# Patient Record
Sex: FEMALE | Race: BLACK OR AFRICAN AMERICAN | NOT HISPANIC OR LATINO | Employment: FULL TIME | URBAN - METROPOLITAN AREA
[De-identification: names, ages, dates, MRNs, and addresses within clinical notes are randomized per-mention and may not be internally consistent; named-entity substitution may affect disease eponyms.]

---

## 2018-02-10 LAB — DEPRECATED S PYO AG THROAT QL EIA: NEGATIVE

## 2018-02-10 PROCEDURE — 99283 EMERGENCY DEPT VISIT LOW MDM: CPT | Mod: 25

## 2018-02-10 PROCEDURE — 81025 URINE PREGNANCY TEST: CPT | Performed by: EMERGENCY MEDICINE

## 2018-02-10 PROCEDURE — 87880 STREP A ASSAY W/OPTIC: CPT

## 2018-02-10 PROCEDURE — 87081 CULTURE SCREEN ONLY: CPT

## 2018-02-11 ENCOUNTER — HOSPITAL ENCOUNTER (EMERGENCY)
Facility: OTHER | Age: 23
Discharge: HOME | End: 2018-02-11
Attending: EMERGENCY MEDICINE
Payer: MEDICAID

## 2018-02-11 VITALS
DIASTOLIC BLOOD PRESSURE: 60 MMHG | SYSTOLIC BLOOD PRESSURE: 120 MMHG | BODY MASS INDEX: 36.65 KG/M2 | OXYGEN SATURATION: 99 % | HEIGHT: 65 IN | WEIGHT: 220 LBS | HEART RATE: 82 BPM | TEMPERATURE: 99 F | RESPIRATION RATE: 16 BRPM

## 2018-02-11 DIAGNOSIS — J02.9 VIRAL PHARYNGITIS: Primary | ICD-10-CM

## 2018-02-11 LAB
B-HCG UR QL: NEGATIVE
CTP QC/QA: YES

## 2018-02-11 PROCEDURE — 81025 URINE PREGNANCY TEST: CPT | Performed by: EMERGENCY MEDICINE

## 2018-02-11 NOTE — ED PROVIDER NOTES
Encounter Date: 2/10/2018    SCRIBE #1 NOTE: I, Lucia Cole, am scribing for, and in the presence of, Dr. Walsh.       History     Chief Complaint   Patient presents with    Sore Throat     Pt painfull swollowing, and voice change since last night     Time seen by provider: 12:24 AM    This is a 22 y.o. female who presents with complaint of sore throat that began yesterday. Patient reports associated voice change. She reports no identifying, exacerbating, or alleviating factors for symptoms. She denies any fever, chills, congestion, cough, shortness of breath, nausea, or vomiting. Patient reports having sick contacts. Patient admits to occasional alcohol use, but denies tobacco or alcohol use.       The history is provided by the patient.     Review of patient's allergies indicates:  No Known Allergies  History reviewed. No pertinent past medical history.  History reviewed. No pertinent surgical history.  Family History   Problem Relation Age of Onset    Breast cancer Neg Hx     Colon cancer Neg Hx     Ovarian cancer Neg Hx      Social History   Substance Use Topics    Smoking status: Former Smoker    Smokeless tobacco: Never Used    Alcohol use Yes      Comment: occasional      Review of Systems   Constitutional: Negative for chills and fever.   HENT: Positive for sore throat and voice change. Negative for congestion.    Eyes: Negative for photophobia and redness.   Respiratory: Negative for cough and shortness of breath.    Cardiovascular: Negative for chest pain.   Gastrointestinal: Negative for abdominal pain, nausea and vomiting.   Genitourinary: Negative for dysuria.   Musculoskeletal: Negative for back pain.   Skin: Negative for rash.   Neurological: Negative for weakness, light-headedness and headaches.   Psychiatric/Behavioral: Negative for confusion.       Physical Exam     Initial Vitals [02/10/18 2336]   BP Pulse Resp Temp SpO2   124/60 86 16 98.6 °F (37 °C) 99 %      MAP       81.33          Physical Exam    Nursing note and vitals reviewed.  Constitutional: She appears well-developed and well-nourished. She is not diaphoretic. No distress.   HENT:   Head: Normocephalic and atraumatic.   Mild tonsillar erythema with no exudates. No peritonsillar or tonsillar abscess formation.     Eyes: Conjunctivae and EOM are normal. Pupils are equal, round, and reactive to light. No scleral icterus.   Neck: Normal range of motion. Neck supple.   Cardiovascular: Normal rate, regular rhythm, S1 normal, S2 normal and normal heart sounds. Exam reveals no gallop and no friction rub.    No murmur heard.  Pulmonary/Chest: Breath sounds normal. No respiratory distress. She has no wheezes. She has no rhonchi. She has no rales.   Abdominal: Soft. Bowel sounds are normal. There is no tenderness. There is no rebound and no guarding.   Musculoskeletal: Normal range of motion. She exhibits no edema or tenderness.   No lower extremity edema.    Lymphadenopathy:     She has no cervical adenopathy.   Neurological: She is alert and oriented to person, place, and time.   Skin: Skin is warm and dry. Capillary refill takes less than 2 seconds. No rash noted. No pallor.   No skin tenting.    Psychiatric: She has a normal mood and affect. Her behavior is normal. Judgment and thought content normal.         ED Course   Procedures  Labs Reviewed   THROAT SCREEN, RAPID   CULTURE, STREP A,  THROAT   POCT URINE PREGNANCY             Medical Decision Making:   Clinical Tests:   Lab Tests: Ordered and Reviewed              Attending Attestation:           Physician Attestation for Scribe:  Physician Attestation Statement for Scribe #1: I, Dr. Walsh, reviewed documentation, as scribed by Lucia Cole in my presence, and it is both accurate and complete.         Attending ED Notes:   Urgent evaluation a 22-year-old female with sore throat.  Patient is afebrile, nontoxic-appearing with stable vital signs.  No trismus, drooling or stridor.   Patient tolerating by mouth intake without complication.  Strep screen is negative.  No peritonsillar tonsillar abscess formation.  The patient is extensively counseled on her diagnosis and treatment, discharged in good condition and directed follow-up with her PCP in the next 24-48 hours.          ED Course      Clinical Impression:     1. Viral pharyngitis                               Noam Manley MD  02/20/18 0745

## 2018-02-11 NOTE — ED NOTES
HEENT: + sore throat, + runny nose, + sneezing.   LOC: The patient is awake, alert and aware of environment with an appropriate affect, the patient is oriented x 4 with appropriate speech.  APPEARANCE: Patient resting comfortably and in no acute distress, clean and well groomed.  SKIN: The skin is warm and dry, patient has normal skin turgor, skin is intact, no breakdown or brusing noted.  MUSKULOSKELETAL: Patient observed ambulating with a steady gait. Patient moving all extremities well, no obvious deformities noted.  RESPIRATORY: Airway is open and patent, respirations are spontaneous, patient has a normal effort and rate. Breath sounds are clear and equal bilaterally. Denies cough. + chest congestion.   CARDIAC: Heart rate and rhythm normal, capillary refill < 3 seconds, no edema noted.

## 2018-02-11 NOTE — ED TRIAGE NOTES
Pt presents to the ED with c/o sore throat since yesterday. + runny nose, + sneezing, + chest congestion, -fever. Pt reports taking theraflu OTC and claritin with no relief. Pt reports similar symptoms last year when she had a cold.

## 2018-02-13 LAB — BACTERIA THROAT CULT: NORMAL

## 2018-02-18 ENCOUNTER — HOSPITAL ENCOUNTER (EMERGENCY)
Facility: OTHER | Age: 23
Discharge: HOME | End: 2018-02-18
Attending: EMERGENCY MEDICINE
Payer: MEDICAID

## 2018-02-18 VITALS
RESPIRATION RATE: 18 BRPM | WEIGHT: 220 LBS | SYSTOLIC BLOOD PRESSURE: 135 MMHG | TEMPERATURE: 99 F | BODY MASS INDEX: 36.65 KG/M2 | DIASTOLIC BLOOD PRESSURE: 86 MMHG | OXYGEN SATURATION: 99 % | HEART RATE: 79 BPM | HEIGHT: 65 IN

## 2018-02-18 DIAGNOSIS — J06.9 VIRAL URI: Primary | ICD-10-CM

## 2018-02-18 LAB
B-HCG UR QL: NEGATIVE
CTP QC/QA: YES

## 2018-02-18 PROCEDURE — 63600175 PHARM REV CODE 636 W HCPCS: Performed by: PHYSICIAN ASSISTANT

## 2018-02-18 PROCEDURE — 96372 THER/PROPH/DIAG INJ SC/IM: CPT

## 2018-02-18 PROCEDURE — 99283 EMERGENCY DEPT VISIT LOW MDM: CPT | Mod: 25

## 2018-02-18 PROCEDURE — 81025 URINE PREGNANCY TEST: CPT | Performed by: EMERGENCY MEDICINE

## 2018-02-18 RX ORDER — KETOROLAC TROMETHAMINE 30 MG/ML
30 INJECTION, SOLUTION INTRAMUSCULAR; INTRAVENOUS
Status: COMPLETED | OUTPATIENT
Start: 2018-02-18 | End: 2018-02-18

## 2018-02-18 RX ORDER — IBUPROFEN 800 MG/1
800 TABLET ORAL EVERY 6 HOURS PRN
Qty: 20 TABLET | Refills: 0 | Status: SHIPPED | OUTPATIENT
Start: 2018-02-18 | End: 2019-11-18

## 2018-02-18 RX ORDER — FLUTICASONE PROPIONATE 50 MCG
1 SPRAY, SUSPENSION (ML) NASAL 2 TIMES DAILY PRN
Qty: 15 G | Refills: 0 | Status: SHIPPED | OUTPATIENT
Start: 2018-02-18 | End: 2019-11-18

## 2018-02-18 RX ADMIN — KETOROLAC TROMETHAMINE 30 MG: 30 INJECTION, SOLUTION INTRAMUSCULAR at 05:02

## 2018-02-18 NOTE — ED PROVIDER NOTES
Encounter Date: 2/18/2018       History     Chief Complaint   Patient presents with    Sinus congestion     Pt reports headache, weakness,bodyaches, decreased appetite x3 days. Nonproductive cough, sinus congestion.      Patient is a 22 y.o. female presenting to the emergency department with complaints of headache, generalized body ache, nasal congestion, and rhinorrhea.  The patient reports her symptoms started 3 days ago and have been constant since.  She also reports she has decreased appetite.  She states that 2 hours ago she took some Tylenol with no significant improvement.  She denies head trauma or injury, blurred vision, nausea, vomiting, fever or chills.  She states her child was recently sick with similar symptoms.      The history is provided by the patient.     Review of patient's allergies indicates:  No Known Allergies  History reviewed. No pertinent past medical history.  History reviewed. No pertinent surgical history.  Family History   Problem Relation Age of Onset    Breast cancer Neg Hx     Colon cancer Neg Hx     Ovarian cancer Neg Hx      Social History   Substance Use Topics    Smoking status: Former Smoker    Smokeless tobacco: Never Used    Alcohol use Yes      Comment: occasional      Review of Systems   Constitutional: Positive for fatigue. Negative for activity change, appetite change, chills and fever.   HENT: Positive for congestion and rhinorrhea. Negative for sore throat.    Eyes: Negative for photophobia and visual disturbance.   Respiratory: Negative for cough, shortness of breath and wheezing.    Cardiovascular: Negative for chest pain.   Gastrointestinal: Negative for abdominal pain, diarrhea, nausea and vomiting.   Genitourinary: Negative for dysuria, hematuria and urgency.   Musculoskeletal: Positive for myalgias. Negative for back pain and neck pain.   Skin: Negative for color change and wound.   Neurological: Positive for headaches. Negative for weakness.    Psychiatric/Behavioral: Negative for agitation and confusion.       Physical Exam     Initial Vitals [02/18/18 1625]   BP Pulse Resp Temp SpO2   (!) 140/73 87 16 99.1 °F (37.3 °C) 100 %      MAP       95.33         Physical Exam    Nursing note and vitals reviewed.  Constitutional: She appears well-developed and well-nourished. She is not diaphoretic. She is cooperative.  Non-toxic appearance. She does not have a sickly appearance. She does not appear ill. No distress.   Well appearing, -American female unaccompanied in the emergency department.  She speaking clear and full sentences.  She is in no acute distress.   HENT:   Head: Normocephalic and atraumatic.   Right Ear: Hearing, tympanic membrane, external ear and ear canal normal.   Left Ear: Hearing, tympanic membrane, external ear and ear canal normal.   Nose: Mucosal edema and rhinorrhea present.   Mouth/Throat: Oropharynx is clear and moist.   Eyes: Conjunctivae and EOM are normal.   Neck: Normal range of motion. Neck supple.   Cardiovascular: Normal rate, regular rhythm and normal heart sounds.   Pulmonary/Chest: Breath sounds normal. No respiratory distress. She has no wheezes.   Musculoskeletal: Normal range of motion.   Neurological: She is alert and oriented to person, place, and time. GCS eye subscore is 4. GCS verbal subscore is 5. GCS motor subscore is 6.   Skin: Skin is warm.   Psychiatric: She has a normal mood and affect. Her behavior is normal. Judgment and thought content normal.         ED Course   Procedures  Labs Reviewed   POCT URINE PREGNANCY             Medical Decision Making:   Initial Assessment:   Urgent evaluation of a 22-year-old female presenting with complaints of nasal congestion, rhinorrhea, body aches.  Patient is afebrile, nontoxic appearing, hemodynamically stable.  Physical exam reveals regular rate and rhythm, lungs are clear to auscultation bilaterally.  Boggy nasal mucosa bilaterally.  Will plan for  Toradol.  Clinical Tests:   Lab Tests: Reviewed and Ordered  The following lab test(s) were unremarkable: UPT  ED Management:  Patient's signs and symptoms are likely secondary to a viral etiology.  I do not feel that she requires an about treatment at this time.  Will plan to treat symptomatically with Flonase, ibuprofen.  Stable for discharge home. The patient was instructed to follow up with a primary care provider in 2 days or to return to the emergency department for worsening symptoms. The treatment plan was discussed with the patient who demonstrated understanding and comfort with plan. The patient's history, physical exam, and plan of care was discussed with and agreed upon with my supervising physician.    Other:   I have discussed this case with another health care provider.       <> Summary of the Discussion: Dr. Espinal  This note was created using Dragon Medical Dictation. There may be typographical errors secondary to dictation.                       Clinical Impression:     1. Viral URI       Disposition:   Disposition: Discharged  Condition: Stable                        Florida Borden PA-C  02/18/18 8039

## 2018-02-18 NOTE — ED NOTES
Two patient identifiers have been checked and are correct.      Appearance: Pt awake, alert & oriented to person, place & time. Pt in no acute distress at present time. Pt is clean and well groomed with clothes appropriately fastened. + sore throat, nasal congestion, dry nonproductive cough.   Skin: Skin warm, dry & intact. Color consistent with ethnicity. Mucous membranes moist. No breakdown or brusing noted.   Musculoskeletal: Patient moving all extremities well, no obvious swelling or deformities noted.   Respiratory: Respirations spontaneous, even, and non-labored. Visible chest rise noted. Airway is open and patent. No accessory muscle use noted.   Neurologic: Sensation is intact. Speech is clear and appropriate. Eyes open spontaneously, behavior appropriate to situation, follows commands, facial expression symmetrical, bilateral hand grasp equal and even, purposeful motor response noted.  Cardiac: All peripheral pulses present. No Bilateral lower extremity edema. Cap refill is <3 seconds.

## 2018-10-03 ENCOUNTER — HOSPITAL ENCOUNTER (EMERGENCY)
Facility: OTHER | Age: 23
Discharge: HOME | End: 2018-10-03
Attending: EMERGENCY MEDICINE
Payer: MEDICAID

## 2018-10-03 VITALS
SYSTOLIC BLOOD PRESSURE: 114 MMHG | DIASTOLIC BLOOD PRESSURE: 59 MMHG | OXYGEN SATURATION: 100 % | WEIGHT: 220 LBS | HEART RATE: 66 BPM | TEMPERATURE: 98 F | BODY MASS INDEX: 35.36 KG/M2 | RESPIRATION RATE: 16 BRPM | HEIGHT: 66 IN

## 2018-10-03 DIAGNOSIS — S91.301A: Primary | ICD-10-CM

## 2018-10-03 LAB
B-HCG UR QL: NEGATIVE
CTP QC/QA: YES

## 2018-10-03 PROCEDURE — 81025 URINE PREGNANCY TEST: CPT | Performed by: EMERGENCY MEDICINE

## 2018-10-03 PROCEDURE — 25000003 PHARM REV CODE 250: Performed by: PHYSICIAN ASSISTANT

## 2018-10-03 PROCEDURE — 99283 EMERGENCY DEPT VISIT LOW MDM: CPT

## 2018-10-03 RX ORDER — IBUPROFEN 600 MG/1
600 TABLET ORAL
Status: COMPLETED | OUTPATIENT
Start: 2018-10-03 | End: 2018-10-03

## 2018-10-03 RX ORDER — BACITRACIN 500 [USP'U]/G
OINTMENT TOPICAL 3 TIMES DAILY
Status: DISCONTINUED | OUTPATIENT
Start: 2018-10-03 | End: 2018-10-03 | Stop reason: HOSPADM

## 2018-10-03 RX ADMIN — BACITRACIN ZINC: 500 OINTMENT TOPICAL at 03:10

## 2018-10-03 RX ADMIN — IBUPROFEN 600 MG: 600 TABLET ORAL at 02:10

## 2018-10-03 NOTE — DISCHARGE INSTRUCTIONS
Apply bacitracin ointment 2 times daily.  Keep wound dry.  You may clean with a mild soap such as Dove or Dial.

## 2018-10-03 NOTE — ED NOTES
Pt stepped on metal vent and piece of metal was in foot. Pulled out with movement of foot. Pt with puncture wound  to little toe side of right foot. No active bleeding noted . Pt aaox3 skin warm and dry ambulated with some difficult to planting right foot on ground.

## 2018-10-03 NOTE — ED PROVIDER NOTES
Encounter Date: 10/3/2018       History     Chief Complaint   Patient presents with    Laceration     pt c/o lac to the bottom of right foot after cutting it on a vent this am. Dressing in place, no bleeding noted.      Patient is a 23-year-old female with no pertinent past medical history who presents to the ED with a puncture wound.  Patient states she punctured the plantar surface of her right foot this morning on a floor vent.  She states her right foot got stuck on the rigid portion that opens and closes the vent.  She states her tetanus was updated 2 years ago.  She is unaware if a foreign body is present.  She reports pain with ambulation.  She denies numbness or tingling to this foot.      The history is provided by the patient.     Review of patient's allergies indicates:  No Known Allergies  No past medical history on file.  No past surgical history on file.  Family History   Problem Relation Age of Onset    Breast cancer Neg Hx     Colon cancer Neg Hx     Ovarian cancer Neg Hx      Social History     Tobacco Use    Smoking status: Former Smoker    Smokeless tobacco: Never Used   Substance Use Topics    Alcohol use: Yes     Comment: occasional     Drug use: No     Review of Systems   Constitutional: Negative for chills and fever.   HENT: Negative for congestion and sore throat.    Eyes: Negative for pain.   Respiratory: Negative for shortness of breath.    Cardiovascular: Negative for chest pain.   Gastrointestinal: Negative for abdominal pain, diarrhea, nausea and vomiting.   Genitourinary: Negative for dysuria.   Musculoskeletal: Negative for arthralgias.   Skin: Positive for wound.   Neurological: Negative for headaches.       Physical Exam     Initial Vitals [10/03/18 1318]   BP Pulse Resp Temp SpO2   (!) 132/56 72 18 98.6 °F (37 °C) 100 %      MAP       --         Physical Exam    Constitutional: Vital signs are normal. She is cooperative.   Patient is weight-bearing ambulates without  difficulty.   HENT:   Head: Normocephalic and atraumatic.   Eyes: EOM are normal. Pupils are equal, round, and reactive to light.   Neck: Normal range of motion. Neck supple.   Cardiovascular: Normal rate, regular rhythm and intact distal pulses.   Abdominal: Soft. Bowel sounds are normal. There is no tenderness.   Musculoskeletal:   Right foot exhibits full range of motion. No bony tenderness or deformity.   Neurological: She is alert and oriented to person, place, and time. GCS eye subscore is 4. GCS verbal subscore is 5. GCS motor subscore is 6.   Strength 5/5 with dorsiflexion and plantar flexion to right foot   Skin: Skin is warm and dry. Capillary refill takes less than 2 seconds. No rash noted.   1 cm, triangular shaped shallow, hemostatic skin avulsion to the lateral aspect of the plantar surface of the right foot just posterior to the right, 5th toe.    Psychiatric: She has a normal mood and affect. Her behavior is normal.         ED Course   Procedures  Labs Reviewed   POCT URINE PREGNANCY          Imaging Results          X-Ray Foot Complete Right (Final result)  Result time 10/03/18 14:23:45    Final result by Ponce Fink MD (10/03/18 14:23:45)                 Impression:      1. No acute displaced fracture or dislocation of the foot, no radiopaque foreign body.      Electronically signed by: Ponce Fink MD  Date:    10/03/2018  Time:    14:23             Narrative:    EXAMINATION:  XR FOOT COMPLETE 3 VIEW RIGHT    CLINICAL HISTORY:  . Unspecified open wound, right foot, initial encounter    TECHNIQUE:  AP, lateral, and oblique views of the right foot were performed.    COMPARISON:  None    FINDINGS:  Three views.    There is hallux valgus.  No acute displaced fracture or dislocation of the foot.  No radiopaque foreign body.                                 Medical Decision Making:   Initial Assessment:   Urgent evaluation of a 23 y.o. female presenting to the emergency department complaining  of wound right foot. Patient is afebrile, nontoxic appearing and hemodynamically stable.  Patient has 1 cm skin avulsion to the plantar aspect of the right foot.  No signs of infection.  Patient is concerned with foreign body.  Although I do not see or palpate on exam, will obtain x-ray.  Primary appears not indicated.  Wound will be cleaned and bacitracin will be applied.  ED Management:  Foot x-ray reveals no fracture.  No evidence of foreign body.  Patient's wound was cleaned bacitracin was applied.  She is given strict wound care and return precautions.  I do not believe rule prophylactic antibiotics are indicated at this time.  Patient's injury was obtained on a clean was not in her house.  She has no other complaints at this time and is stable for discharge.  This note was created using MModal Dictation. There may be typographical errors secondary to dictation.                       Clinical Impression:        1. Avulsion of skin of foot, right, initial encounter                               Kris Mars PA-C  10/03/18 5210

## 2019-02-08 ENCOUNTER — HOSPITAL ENCOUNTER (EMERGENCY)
Facility: OTHER | Age: 24
Discharge: HOME | End: 2019-02-08
Attending: EMERGENCY MEDICINE
Payer: MEDICAID

## 2019-02-08 VITALS
SYSTOLIC BLOOD PRESSURE: 131 MMHG | TEMPERATURE: 100 F | HEIGHT: 66 IN | HEART RATE: 101 BPM | OXYGEN SATURATION: 97 % | WEIGHT: 214.5 LBS | BODY MASS INDEX: 34.47 KG/M2 | DIASTOLIC BLOOD PRESSURE: 71 MMHG | RESPIRATION RATE: 20 BRPM

## 2019-02-08 DIAGNOSIS — J10.1 INFLUENZA A: Primary | ICD-10-CM

## 2019-02-08 LAB
B-HCG UR QL: NEGATIVE
CTP QC/QA: YES
INFLUENZA A, MOLECULAR: POSITIVE
INFLUENZA B, MOLECULAR: NEGATIVE
SPECIMEN SOURCE: ABNORMAL

## 2019-02-08 PROCEDURE — 81025 URINE PREGNANCY TEST: CPT | Performed by: EMERGENCY MEDICINE

## 2019-02-08 PROCEDURE — 87502 INFLUENZA DNA AMP PROBE: CPT

## 2019-02-08 PROCEDURE — 99283 EMERGENCY DEPT VISIT LOW MDM: CPT

## 2019-02-08 RX ORDER — OSELTAMIVIR PHOSPHATE 75 MG/1
75 CAPSULE ORAL 2 TIMES DAILY
Qty: 10 CAPSULE | Refills: 0 | Status: SHIPPED | OUTPATIENT
Start: 2019-02-08 | End: 2019-02-13

## 2019-02-09 NOTE — ED TRIAGE NOTES
Angie Rodriguez, a 23 y.o. female presents to the ED     Triage note:  Chief Complaint   Patient presents with    Influenza     fever, chills, non- productive cough, headache and scratchy throat since this AM     Review of patient's allergies indicates:  No Known Allergies  History reviewed. No pertinent past medical history.      Two patient identifiers have been checked and are correct.      Appearance: Pt awake, alert & oriented to person, place & time. Pt in no acute distress at present time. Pt is clean and well groomed with clothes appropriately fastened.   Skin: Skin warm, dry & intact. Color consistent with ethnicity. Mucous membranes moist. No breakdown or brusing noted.   Musculoskeletal: Patient moving all extremities well, no obvious swelling or deformities noted.   Respiratory: Respirations spontaneous, even, and non-labored. Visible chest rise noted. Airway is open and patent. Denies SOB. Reports non-productive cough.  Neurologic:  Eyes open spontaneously, behavior appropriate to situation, follows commands. Reports HA.  Cardiac: All peripheral pulses present. No Bilateral lower extremity edema. Denies CP.  Abdomen: Abdomen soft, non-tender to palpation. Denies NVD.  : Pt reports no dysuria or hematuria.

## 2019-02-09 NOTE — ED PROVIDER NOTES
Encounter Date: 2/8/2019       History     Chief Complaint   Patient presents with    Influenza     fever, chills, non- productive cough, headache and scratchy throat since this AM     23-year-old female presents with flu-like symptoms in the middle of a flu epidemic.  Symptoms started this morning.  She has body aches headache dry cough and muscle aches.  Denies vomiting.          Review of patient's allergies indicates:  No Known Allergies  History reviewed. No pertinent past medical history.  History reviewed. No pertinent surgical history.  Family History   Problem Relation Age of Onset    Breast cancer Neg Hx     Colon cancer Neg Hx     Ovarian cancer Neg Hx      Social History     Tobacco Use    Smoking status: Former Smoker    Smokeless tobacco: Never Used   Substance Use Topics    Alcohol use: Yes     Comment: occasional     Drug use: No     Review of Systems   Constitutional: Positive for chills and fever.   HENT: Positive for congestion, sneezing and sore throat. Negative for trouble swallowing.    Eyes: Negative for visual disturbance.   Respiratory: Negative for shortness of breath.    Cardiovascular: Negative for chest pain.   Gastrointestinal: Negative for abdominal pain and vomiting.   Genitourinary: Negative for dysuria.   Musculoskeletal: Positive for arthralgias and myalgias. Negative for neck stiffness.   Neurological: Positive for headaches. Negative for dizziness.   Psychiatric/Behavioral: Negative for agitation.       Physical Exam     Initial Vitals [02/08/19 2154]   BP Pulse Resp Temp SpO2   131/71 101 20 99.8 °F (37.7 °C) 97 %      MAP       --         Physical Exam    Nursing note and vitals reviewed.  Constitutional:   Patient is nontoxic, smiling, well-hydrated   HENT:   Head: Normocephalic and atraumatic.   Nose: Nose normal.   Mouth/Throat: Oropharynx is clear and moist.   Eyes: Pupils are equal, round, and reactive to light.   Neck: Normal range of motion. Neck supple. No JVD  present.   Cardiovascular: Normal rate, regular rhythm and normal heart sounds.   Pulmonary/Chest: Breath sounds normal. No respiratory distress. She has no wheezes. She has no rhonchi. She has no rales. She exhibits no tenderness.   Abdominal: Soft.   Musculoskeletal: Normal range of motion. She exhibits no edema or tenderness.   Lymphadenopathy:     She has no cervical adenopathy.   Neurological: She is alert. She has normal strength. No sensory deficit. GCS score is 15. GCS eye subscore is 4. GCS verbal subscore is 5. GCS motor subscore is 6.   Skin: Skin is warm. Capillary refill takes less than 2 seconds. No rash noted.   Psychiatric: She has a normal mood and affect.         ED Course   Procedures  Labs Reviewed   INFLUENZA A & B BY MOLECULAR - Abnormal; Notable for the following components:       Result Value    Influenza A, Molecular Positive (*)     All other components within normal limits   POCT URINE PREGNANCY          Imaging Results    None          Medical Decision Making:   Initial Assessment:   Patient with flu symptoms in the middle of a flu epidemic.  She did not get a flu shot this year.  We discussed Tamiflu.  She understands the benefits and risks of taking it including the financial implications.  Will prescribe assuming flu test is positive. She does not appear acutely ill.  No symptoms or findings to suggest pneumonia or meningitis.  Clinical Tests:   Lab Tests: Ordered and Reviewed                      Clinical Impression:   The encounter diagnosis was Influenza A.      Disposition:   Disposition: Discharged  Condition: Stable                        Rodriguez Ku MD  02/08/19 8900

## 2019-07-17 ENCOUNTER — HOSPITAL ENCOUNTER (EMERGENCY)
Facility: OTHER | Age: 24
Discharge: HOME | End: 2019-07-17
Attending: EMERGENCY MEDICINE
Payer: MEDICAID

## 2019-07-17 VITALS
HEIGHT: 66 IN | RESPIRATION RATE: 20 BRPM | BODY MASS INDEX: 35.72 KG/M2 | SYSTOLIC BLOOD PRESSURE: 121 MMHG | DIASTOLIC BLOOD PRESSURE: 72 MMHG | WEIGHT: 222.25 LBS | TEMPERATURE: 99 F | OXYGEN SATURATION: 98 % | HEART RATE: 94 BPM

## 2019-07-17 DIAGNOSIS — L92.3 FOREIGN BODY REACTION TO TATTOO DYES: Primary | ICD-10-CM

## 2019-07-17 LAB
B-HCG UR QL: NEGATIVE
CTP QC/QA: YES

## 2019-07-17 PROCEDURE — 81025 URINE PREGNANCY TEST: CPT | Performed by: EMERGENCY MEDICINE

## 2019-07-17 PROCEDURE — 25000003 PHARM REV CODE 250: Performed by: EMERGENCY MEDICINE

## 2019-07-17 PROCEDURE — 99284 EMERGENCY DEPT VISIT MOD MDM: CPT | Mod: 25

## 2019-07-17 RX ORDER — MUPIROCIN 20 MG/G
OINTMENT TOPICAL 3 TIMES DAILY
Qty: 22 G | Refills: 0 | Status: SHIPPED | OUTPATIENT
Start: 2019-07-17 | End: 2019-11-18

## 2019-07-17 RX ORDER — MUPIROCIN 20 MG/G
1 OINTMENT TOPICAL
Status: COMPLETED | OUTPATIENT
Start: 2019-07-17 | End: 2019-07-17

## 2019-07-17 RX ADMIN — MUPIROCIN 22 G: 20 OINTMENT TOPICAL at 08:07

## 2019-07-18 NOTE — DISCHARGE INSTRUCTIONS
Consider HIV and hepatitis C test in 3-6 months.  Return for spreading redness, fever, vomiting or new or worsening symptoms.

## 2019-07-18 NOTE — ED PROVIDER NOTES
"Encounter Date: 7/17/2019    SCRIBE #1 NOTE: IYuri, am scribing for, and in the presence of, Dr. Thomas.   SCRIBE #2 NOTE: I, Tiki Givens, am scribing for, and in the presence of, Dr. Thomas.     History     Chief Complaint   Patient presents with    Wound Check     new tattoo problem, red, scabby, angry looking     Time seen by provider: 8:33 PM    This is a 24 y.o. female who presents with complaint of irritation to tattoo on right lower leg. Patient reports she had the tattoo done by a friend with a sterile needle at home two weeks ago. She notes red ink was used. She reports the tattoo looked like "a wet bubble" during the first four days and began to scab on the fifth day. Patient reports she used A&D ointment. She denies taking any OTC medication for pain. Patient denies fever.    The history is provided by the patient.     Review of patient's allergies indicates:  No Known Allergies  History reviewed. No pertinent past medical history.  History reviewed. No pertinent surgical history.  Family History   Problem Relation Age of Onset    Breast cancer Neg Hx     Colon cancer Neg Hx     Ovarian cancer Neg Hx      Social History     Tobacco Use    Smoking status: Former Smoker    Smokeless tobacco: Never Used   Substance Use Topics    Alcohol use: Yes     Comment: occasional     Drug use: No     Review of Systems   Constitutional: Negative for fever.   HENT: Negative for sore throat.    Respiratory: Negative for shortness of breath.    Cardiovascular: Negative for chest pain.   Gastrointestinal: Negative for nausea.   Genitourinary: Negative for dysuria.   Musculoskeletal: Negative for back pain.   Skin: Positive for wound (irritated tattoo).   Neurological: Negative for weakness.   Hematological: Does not bruise/bleed easily.   All other systems reviewed and are negative.      Physical Exam     Initial Vitals [07/17/19 2001]   BP Pulse Resp Temp SpO2   121/72 94 20 98.6 °F (37 °C) 98 %    "   MAP       --         Physical Exam    Nursing note and vitals reviewed.  Constitutional: She appears well-developed and well-nourished. She is not diaphoretic. No distress.   HENT:   Head: Normocephalic and atraumatic.   Cardiovascular: Intact distal pulses.   Pulmonary/Chest: No respiratory distress.   Musculoskeletal: She exhibits no edema.   No swelling of the right foot.   Neurological: She is alert and oriented to person, place, and time.   Skin: Skin is warm. Capillary refill takes less than 2 seconds.   RLE: Right distal lower anterior leg tattoo noted with eschar formation across area of tattoo. Minimal surrounding granulation tissue. No drainage or significant surrounding erythema. Area of skin breakdown and sloughing of dye material. Removed her shoe with no erythema of the foot.   Psychiatric: She has a normal mood and affect.         ED Course   Procedures  Labs Reviewed   POCT URINE PREGNANCY          Imaging Results    None          Medical Decision Making:   Differential Diagnosis:   Allergic reaction, cellulitis, foreign body reaction, granulation tissue, contact dermatitis,   ED Management:  Advised patient of local wound care and risk of blood borne pathogens, advised hepatitis and HIV testing and f/u within 6 mos for retesting, barrier protection            Scribe Attestation:   Scribe #1: I performed the above scribed service and the documentation accurately describes the services I performed. I attest to the accuracy of the note.  Scribe #2: I performed the above scribed service and the documentation accurately describes the services I performed. I attest to the accuracy of the note.    Attending Attestation:           Physician Attestation for Scribe:  Physician Attestation Statement for Scribe #1: I, Dr. Thomas, reviewed documentation, as scribed by Yuri Rose in my presence, and it is both accurate and complete.   Physician Attestation Statement for Scribe #2: I, Dr. Thomas, reviewed  documentation, as scribed by Tiki Givens in my presence, and it is both accurate and complete. I also acknowledge and confirm the content of the note done by Scribe #1.                 Clinical Impression:     1. Foreign body reaction to tattoo dyes          Disposition:   Disposition: Discharged  Condition: Stable                        Jess Thomas MD  07/22/19 6413

## 2019-11-11 ENCOUNTER — HOSPITAL ENCOUNTER (EMERGENCY)
Facility: OTHER | Age: 24
Discharge: HOME | End: 2019-11-11
Attending: EMERGENCY MEDICINE
Payer: MEDICAID

## 2019-11-11 VITALS
BODY MASS INDEX: 31.53 KG/M2 | OXYGEN SATURATION: 100 % | SYSTOLIC BLOOD PRESSURE: 138 MMHG | RESPIRATION RATE: 18 BRPM | TEMPERATURE: 98 F | HEART RATE: 70 BPM | DIASTOLIC BLOOD PRESSURE: 91 MMHG | WEIGHT: 195.31 LBS

## 2019-11-11 DIAGNOSIS — J04.0 LARYNGITIS: Primary | ICD-10-CM

## 2019-11-11 LAB
B-HCG UR QL: NEGATIVE
CTP QC/QA: YES

## 2019-11-11 PROCEDURE — 81025 URINE PREGNANCY TEST: CPT | Performed by: EMERGENCY MEDICINE

## 2019-11-11 PROCEDURE — 99284 EMERGENCY DEPT VISIT MOD MDM: CPT | Mod: 25

## 2019-11-11 PROCEDURE — 25000003 PHARM REV CODE 250: Performed by: PHYSICIAN ASSISTANT

## 2019-11-11 RX ORDER — IBUPROFEN 400 MG/1
400 TABLET ORAL
Status: COMPLETED | OUTPATIENT
Start: 2019-11-11 | End: 2019-11-11

## 2019-11-11 RX ORDER — IBUPROFEN 600 MG/1
600 TABLET ORAL EVERY 6 HOURS PRN
Qty: 12 TABLET | Refills: 0 | Status: SHIPPED | OUTPATIENT
Start: 2019-11-11 | End: 2019-11-14

## 2019-11-11 RX ORDER — BENZONATATE 100 MG/1
100 CAPSULE ORAL 3 TIMES DAILY PRN
Qty: 21 CAPSULE | Refills: 0 | Status: SHIPPED | OUTPATIENT
Start: 2019-11-11 | End: 2019-11-18

## 2019-11-11 RX ADMIN — IBUPROFEN 400 MG: 400 TABLET, FILM COATED ORAL at 01:11

## 2019-11-11 NOTE — ED PROVIDER NOTES
Encounter Date: 11/11/2019       History     Chief Complaint   Patient presents with    Hoarse     cough, cold, sore throat and hoarse for 2 days     Patient is a 24-year-old female with no past medical history who presents the emergency department with headache, cough, sore throat, loss of voice, and nasal congestion that began 1 day prior to arrival.  She denies fever, chills, chest pain, shortness of breath, nausea, vomiting. She also reports one episode of diarrhea that occurred last week. She reports taking Robitussin at home with no relief.  She denies any alleviating or aggravating factors.  She reports that her daughter has also been sick with a cold.  Patient is unsure flu vaccination status this year.         Review of patient's allergies indicates:  No Known Allergies  History reviewed. No pertinent past medical history.  History reviewed. No pertinent surgical history.  Family History   Problem Relation Age of Onset    Breast cancer Neg Hx     Colon cancer Neg Hx     Ovarian cancer Neg Hx      Social History     Tobacco Use    Smoking status: Former Smoker    Smokeless tobacco: Never Used   Substance Use Topics    Alcohol use: Yes     Comment: occasional     Drug use: No     Review of Systems   Constitutional: Negative for chills, fatigue and fever.   HENT: Positive for congestion, postnasal drip, rhinorrhea, sore throat and voice change. Negative for ear discharge, ear pain, sinus pressure, sinus pain and trouble swallowing.    Eyes: Negative.    Respiratory: Positive for cough. Negative for apnea, chest tightness and shortness of breath.    Cardiovascular: Negative for chest pain and palpitations.   Gastrointestinal: Positive for diarrhea (resolved). Negative for abdominal pain, constipation, nausea and vomiting.   Endocrine: Negative.    Genitourinary: Negative.    Musculoskeletal: Negative.    Skin: Negative.    Allergic/Immunologic: Negative.    Neurological: Positive for headaches. Negative  for light-headedness.   Hematological: Negative.    Psychiatric/Behavioral: Negative.        Physical Exam     Initial Vitals [11/11/19 1325]   BP Pulse Resp Temp SpO2   121/75 75 16 98.6 °F (37 °C) 99 %      MAP       --         Physical Exam    Constitutional: She appears well-developed and well-nourished. She is not diaphoretic. No distress.   HENT:   Head: Normocephalic and atraumatic.   Right Ear: External ear normal.   Left Ear: External ear normal.   Nose: Nose normal.   Mouth/Throat: Uvula is midline and mucous membranes are normal. Posterior oropharyngeal erythema present. No oropharyngeal exudate.   Eyes: Conjunctivae and EOM are normal. Pupils are equal, round, and reactive to light.   Neck: Normal range of motion. Neck supple.   Cardiovascular: Normal rate, regular rhythm, normal heart sounds and intact distal pulses.   Pulmonary/Chest: Breath sounds normal.   Abdominal: Soft. Bowel sounds are normal.   Musculoskeletal: Normal range of motion.   Lymphadenopathy:     She has no cervical adenopathy.   Neurological: She is alert and oriented to person, place, and time. She has normal strength. GCS score is 15. GCS eye subscore is 4. GCS verbal subscore is 5. GCS motor subscore is 6.   Skin: Skin is warm and dry. Capillary refill takes less than 2 seconds.   Psychiatric: She has a normal mood and affect. Her behavior is normal. Judgment and thought content normal.         ED Course   Procedures  Labs Reviewed - No data to display          Medical Decision Making:   Initial Assessment:   Urgent evaluation of a 24-year-old female who presents to the emergency department with complaints of headache, cough, sore throat, loss of voice, nasal congestion, and 1 episode of diarrhea that began 1 day prior to arrival.  Patient is afebrile, hemodynamically stable, and nontoxic appearing.  Physical exam reveals erythema to the posterior pharynx.  Will order ibuprofen for pain relief.  ED Management:  Patient was given  ibuprofen for sore throat in the emergency department.  I do not believe that any imaging or labs are warranted at this time.  Will discharge home with ibuprofen and Tessalon Perles.  Patient also instructed to take Cepacol for sore throat. All questions answered. The patient was instructed to follow up with a primary care provider in 2 days or to return to the emergency department for worsening symptoms. The treatment plan was discussed with the patient who demonstrated understanding and comfort with plan. The patient's history, physical exam, and plan of care was discussed with and agreed upon with my supervising physician.                                    Clinical Impression:     1. Laryngitis        Disposition:   Disposition: Discharged  Condition: Stable               Tawana Jaime PA-C  11/11/19 3925

## 2019-11-11 NOTE — ED NOTES
Pt to ED with c/o generalized body aches, sore throat, and voice hoarseness x2 days, no relief from OTC medications. Pt only able to verbalize by whispering. Pt does not appear in respiratory distress, speaking in clear and complete sentences, RR even and unlabored.     Two patient identifiers have been checked and are correct.      Appearance: Pt awake, alert & oriented to person, place & time. Pt in no acute distress at present time. Pt is clean and well groomed with clothes appropriately fastened. Hoarseness.   Skin: Skin warm, dry & intact. Color consistent with ethnicity. Mucous membranes moist. No breakdown or brusing noted.   Musculoskeletal: Patient moving all extremities well, no obvious swelling or deformities noted.   Respiratory: Respirations spontaneous, even, and non-labored. Visible chest rise noted. Airway is open and patent. No accessory muscle use noted.   Neurologic: Sensation is intact. Speech is clear and appropriate. Eyes open spontaneously, behavior appropriate to situation, follows commands, facial expression symmetrical, bilateral hand grasp equal and even, purposeful motor response noted.  Cardiac: All peripheral pulses present. No Bilateral lower extremity edema. Cap refill is <3 seconds.  Abdomen: Abdomen soft, non-tender to palpation.   : Pt reports no dysuria or hematuria.

## 2019-11-18 PROBLEM — Z97.5 IUD (INTRAUTERINE DEVICE) IN PLACE: Status: ACTIVE | Noted: 2019-11-18

## 2019-11-24 PROBLEM — R87.612 LGSIL ON PAP SMEAR OF CERVIX: Status: ACTIVE | Noted: 2019-11-24

## 2021-06-30 ENCOUNTER — IMMUNIZATION (OUTPATIENT)
Dept: INTERNAL MEDICINE | Facility: CLINIC | Age: 26
End: 2021-06-30
Payer: MEDICAID

## 2021-06-30 DIAGNOSIS — Z23 NEED FOR VACCINATION: Primary | ICD-10-CM

## 2021-06-30 PROCEDURE — 91300 COVID-19, MRNA, LNP-S, PF, 30 MCG/0.3 ML DOSE VACCINE: CPT | Mod: PBBFAC

## 2021-08-04 ENCOUNTER — IMMUNIZATION (OUTPATIENT)
Dept: INTERNAL MEDICINE | Facility: CLINIC | Age: 26
End: 2021-08-04
Payer: MEDICAID

## 2021-08-04 DIAGNOSIS — Z23 NEED FOR VACCINATION: Primary | ICD-10-CM

## 2021-08-04 PROCEDURE — 91300 COVID-19, MRNA, LNP-S, PF, 30 MCG/0.3 ML DOSE VACCINE: ICD-10-PCS | Mod: ,,, | Performed by: INTERNAL MEDICINE

## 2021-08-04 PROCEDURE — 0002A COVID-19, MRNA, LNP-S, PF, 30 MCG/0.3 ML DOSE VACCINE: ICD-10-PCS | Mod: CV19,,, | Performed by: INTERNAL MEDICINE

## 2021-08-04 PROCEDURE — 91300 COVID-19, MRNA, LNP-S, PF, 30 MCG/0.3 ML DOSE VACCINE: CPT | Mod: ,,, | Performed by: INTERNAL MEDICINE

## 2021-08-04 PROCEDURE — 0002A COVID-19, MRNA, LNP-S, PF, 30 MCG/0.3 ML DOSE VACCINE: CPT | Mod: CV19,,, | Performed by: INTERNAL MEDICINE

## 2022-01-10 ENCOUNTER — HOSPITAL ENCOUNTER (EMERGENCY)
Facility: OTHER | Age: 27
Discharge: HOME | End: 2022-01-10
Attending: EMERGENCY MEDICINE
Payer: MEDICAID

## 2022-01-10 VITALS
RESPIRATION RATE: 18 BRPM | HEART RATE: 78 BPM | TEMPERATURE: 98 F | WEIGHT: 214 LBS | OXYGEN SATURATION: 99 % | BODY MASS INDEX: 35.65 KG/M2 | SYSTOLIC BLOOD PRESSURE: 110 MMHG | DIASTOLIC BLOOD PRESSURE: 72 MMHG | HEIGHT: 65 IN

## 2022-01-10 DIAGNOSIS — S30.860A INSECT BITE OF LOWER BACK, INITIAL ENCOUNTER: Primary | ICD-10-CM
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
## 2022-01-10 LAB
B-HCG UR QL: NEGATIVE
CTP QC/QA: YES

## 2022-01-10 PROCEDURE — 99283 EMERGENCY DEPT VISIT LOW MDM: CPT

## 2022-01-10 PROCEDURE — 81025 URINE PREGNANCY TEST: CPT | Performed by: EMERGENCY MEDICINE

## 2022-01-10 RX ORDER — CETIRIZINE HYDROCHLORIDE 10 MG/1
10 TABLET ORAL DAILY
Qty: 30 TABLET | Refills: 0 | Status: SHIPPED | OUTPATIENT
Start: 2022-01-10 | End: 2023-01-10

## 2022-01-10 RX ORDER — HYDROCORTISONE 1 %
CREAM (GRAM) TOPICAL
Qty: 30 G | Refills: 0 | Status: SHIPPED | OUTPATIENT
Start: 2022-01-10

## 2022-01-10 NOTE — ED PROVIDER NOTES
"     Source of History:  patient    Chief complaint:  Bumps on back (Pt states she has bumps to her back that have been itching her. )      HPI:  Angie Rodriguez is a 26 y.o. female presenting with possible insect bite or sting.  Patient reports that she has approximately 3 raised pruritic skin eruptions the right lower back.  She states that she noted them  last night and they have gradually worsened today.  She states she is unsure of the factor and denies any other involved areas or any other family members with similar eruptions.  She has not tried any medications for symptoms and came promptly to the ED after work.    This is the extent to the patients complaints today here in the emergency department.    ROS: As per HPI and below:  Constitutional: No fever.  No chills.  Eyes: No visual changes.   ENT: No sore throat. No ear pain.  Urinary: No abnormal urination.  MSK:  No arthralgias  Integument:  Skin eruption to right lower back    Review of patient's allergies indicates:  No Known Allergies    PMH:  As per HPI and below:  History reviewed. No pertinent past medical history.  History reviewed. No pertinent surgical history.    Social History     Tobacco Use    Smoking status: Former Smoker    Smokeless tobacco: Never Used   Substance Use Topics    Alcohol use: Yes     Comment: occasional     Drug use: No       Physical Exam:    /72 (BP Location: Left arm, Patient Position: Sitting)   Pulse 78   Temp 98.4 °F (36.9 °C) (Oral)   Resp 18   Ht 5' 5" (1.651 m)   Wt 97.1 kg (214 lb)   SpO2 99%   BMI 35.61 kg/m²   Nursing note and vital signs reviewed.  Constitutional: No acute distress.  Eyes: No conjunctival injection.  Extraocular muscles are intact.  ENT: Normal phonation.  Musculoskeletal:  Full range of motion of all extremities  Skin:  3 small raised erythematous with urticaria appearance with no other involved areas.  No crusted or vesicular appearance.  No rashes seen.  Good turgor.  No " abrasions.  No ecchymoses.  Psych: Appropriate, conversant.        I decided to obtain the patient's medical records.        MDM:    26 y.o. female with localized area of skin eruption to the right lower back that she noted last night.  She does report it is pruritic.  Denies any associated symptoms including fever, chills, URI symptoms or additional complaints.  Has not tried any medications.  Physical exam reveals 3 small raised erythematous with urticaria appearance with no other involved areas.  No crusted or vesicular appearance.  No rashes seen.  Good turgor.  No abrasions.     DDX:  Local reaction to insect bite, multiple insect bites, local infection, cellulitis, abscess, allergic reaction, contact dermatitis    ED management:  Patient reassured appears symptoms are likely related to localized eruption secondary to probable insect bite or sting.  Encouraged to  house for any factors and remove as discussed.  Will start on oral antihistamine and topical hydrocortisone to help with primary complain of pruritus. Strict instructions to follow up with primary care physician or reference provided for further assessment and evaluation. Given instructions to return for any acute symptoms and verbalized understanding of this medical plan.                   Diagnostic Impression:    1. Insect bite of lower back, initial encounter         ED Disposition Condition    Discharge Stable          ED Prescriptions     Medication Sig Dispense Start Date End Date Auth. Provider    cetirizine (ZYRTEC) 10 MG tablet Take 1 tablet (10 mg total) by mouth once daily. 30 tablet 1/10/2022 1/10/2023 JO Hemphill    hydrocortisone 1 % cream Apply to affected area 2 times daily 30 g 1/10/2022  JO Hemphill        Follow-up Information     Follow up With Specialties Details Why Contact Info    [facility] - Danitza Cullen  Go to   [address], [address]  [place]  [address]  [phone]             JO Hemphill  01/12/22 1131

## 2022-01-10 NOTE — Clinical Note
"Angie Lynryan Rodriguez was seen and treated in our emergency department on 1/10/2022.  She may return to work on 01/11/2022.       If you have any questions or concerns, please don't hesitate to call.      JO Hemphill"

## 2022-01-10 NOTE — ED TRIAGE NOTES
Pt states she woke up this morning with 3 bumps on her right low back that are itching, red, and swollen. Patient thinks she got bit by something at work or while bringing her son to the bus stop yesterday morning when she walked under a tree.

## 2022-05-10 ENCOUNTER — HOSPITAL ENCOUNTER (EMERGENCY)
Facility: OTHER | Age: 27
Discharge: HOME | End: 2022-05-10
Attending: EMERGENCY MEDICINE
Payer: MEDICAID

## 2022-05-10 VITALS
WEIGHT: 213 LBS | HEART RATE: 98 BPM | HEIGHT: 66 IN | SYSTOLIC BLOOD PRESSURE: 114 MMHG | BODY MASS INDEX: 34.23 KG/M2 | DIASTOLIC BLOOD PRESSURE: 77 MMHG | TEMPERATURE: 100 F | RESPIRATION RATE: 18 BRPM | OXYGEN SATURATION: 98 %

## 2022-05-10 DIAGNOSIS — U07.1 COVID-19: Primary | ICD-10-CM

## 2022-05-10 LAB
CTP QC/QA: YES
SARS-COV-2 RDRP RESP QL NAA+PROBE: POSITIVE

## 2022-05-10 PROCEDURE — 25000003 PHARM REV CODE 250: Performed by: NURSE PRACTITIONER

## 2022-05-10 PROCEDURE — 99284 EMERGENCY DEPT VISIT MOD MDM: CPT | Mod: 25

## 2022-05-10 PROCEDURE — U0002 COVID-19 LAB TEST NON-CDC: HCPCS | Performed by: EMERGENCY MEDICINE

## 2022-05-10 RX ORDER — BENZONATATE 100 MG/1
100 CAPSULE ORAL 3 TIMES DAILY PRN
Qty: 20 CAPSULE | Refills: 0 | Status: SHIPPED | OUTPATIENT
Start: 2022-05-10 | End: 2022-05-20

## 2022-05-10 RX ORDER — ALBUTEROL SULFATE 90 UG/1
1-2 AEROSOL, METERED RESPIRATORY (INHALATION) EVERY 6 HOURS PRN
Qty: 6.7 G | Refills: 0 | Status: SHIPPED | OUTPATIENT
Start: 2022-05-10 | End: 2023-05-10

## 2022-05-10 RX ORDER — IPRATROPIUM BROMIDE AND ALBUTEROL SULFATE 2.5; .5 MG/3ML; MG/3ML
3 SOLUTION RESPIRATORY (INHALATION) EVERY 6 HOURS PRN
Qty: 75 ML | Refills: 0 | Status: SHIPPED | OUTPATIENT
Start: 2022-05-10 | End: 2022-05-10 | Stop reason: ALTCHOICE

## 2022-05-10 RX ORDER — ACETAMINOPHEN 500 MG
1000 TABLET ORAL
Status: COMPLETED | OUTPATIENT
Start: 2022-05-10 | End: 2022-05-10

## 2022-05-10 RX ADMIN — ACETAMINOPHEN 1000 MG: 500 TABLET, FILM COATED ORAL at 01:05

## 2022-05-10 NOTE — Clinical Note
"Angie"Lilia Rodriguez was seen and treated in our emergency department on 5/10/2022.  She may return to work on 05/16/2022.       If you have any questions or concerns, please don't hesitate to call.      ANIL Matos"

## 2022-05-10 NOTE — DISCHARGE INSTRUCTIONS
If you have a COVID Test PENDING:  Please access [facility] to review the results of your test. Until the results of your COVID test return, you should isolate yourself so as not to potentially spread illness to others.   If your COVID test returns positive, you should isolate yourself so as not to spread illness to others. After five full days, if you are feeling better and you have not had fever for 24 hours, you can return to your typical daily activities, but you must wear a mask around others for an additional 5 days.   If your COVID test returns negative and you are either unvaccinated or more than six months out from your two-dose vaccine and are not yet boosted, you should still quarantine for 5 full days followed by strict mask use for an additional 5 full days.   If your COVID test returns negative and you have received your 2-dose initial vaccine as well as a booster, you should continue strict mask use for 10 full days after the exposure.  For all those exposed, best practice includes a test at day 5 after the exposure. This can be a home test or a test through one of the many testing centers throughout our community.   Masking is always advised to limit the spread of COVID. Cdc.gov is an excellent site to obtain the latest up to date recommendations regarding COVID and COVID testing.     After your evaluation today, we ruled out any emergent condition. However, if you develop shortness of breath, chest pain, or ANY OTHER CONCERNS please return to the emergency department for further care.      CDC Testing and Quarantine Guidelines for patients with exposure to a known-positive COVID-19 person:  A close exposure is defined as anyone who has had an exposure (masked or unmasked) to a known COVID -19 positive person within 6 feet of someone for a cumulative total of 15 minutes or more over a 24-hour period.   Vaccinated and/or if you recently had a positive covid test within 90 days do NOT need to  quarantine after contact with someone who had COVID-19 unless you develop symptoms.   Fully vaccinated people who have not had a positive test within 90 days, should get tested 3-5 days after their exposure, even if they don't have symptoms and wear a mask indoors in public for 14 days following exposure or until their test result is negative.      Unvaccinated and/or NOT had a positive test within 90 days and meet close exposure  You are required by CDC guidelines to quarantine for at least 5 days from time of exposure followed by 5 days of strict masking. It is recommended, but not required to test after 5 days, unless you develop symptoms, in which case you should test at that time.  If you get tested after 5 days and your test is positive, your 5 day period of isolation starts the day of the positive test.    If your exposure does not meet the above definition, you can return to your normal daily activities to include social distancing, wearing a mask and frequent handwashing.      Here is a link to guidance from the CDC:  https://www.cdc.gov/media/releases/2021/s1227-isolation-quarantine-guidance.html      [facility] Testing Sites:  https://ldh.la.gov/page/3934      [facility] website with testing locations and guidance:  [email or web address]

## 2022-05-10 NOTE — ED PROVIDER NOTES
"Source of History:  Patient    Chief complaint:  sore throat, chills, fatigue (Pt c.o sore throat, chills, and fatigue onset last night.  AAO x  3 nadn skin w.d throat slightly red no exudate noted. )      HPI:  Angie Rodriguez is a 27 y.o. female presenting with body aches, fatigue, chills and sore throat that began this morning.  Reports feeling fine yesterday.  Denies any sick contacts at home.  She denies any coughor any chest pain shortness of breath.    This is the extent to the patients complaints today here in the emergency department.    PMH:  As per HPI and below:  No past medical history on file.  No past surgical history on file.    Social History     Tobacco Use    Smoking status: Former Smoker    Smokeless tobacco: Never Used   Substance Use Topics    Alcohol use: Yes     Comment: occasional     Drug use: No     Review of patient's allergies indicates:  No Known Allergies    ROS: As per HPI and below:  General: No fever,  Chills, body aches  Eyes: No visual changes.  ENT:  Cough, sore throat  Head:  headache.    Chest:  No shortness of breath.  Cardiovascular: No chest pain.  Abdomen: No abdominal pain.  No nausea or vomiting.  Genito-Urinary: No abnormal urination.  Neurologic: No focal weakness.  No numbness.  MSK: no back pain.  Integument: No rashes or lesions.  Hematologic: No easy bruising.  Endocrine: No excessive thirst or urination.    Physical Exam:    /77 (BP Location: Left arm, Patient Position: Sitting)   Pulse 98   Temp 100.3 °F (37.9 °C) (Oral)   Resp 18   Ht 5' 6" (1.676 m)   Wt 96.6 kg (213 lb)   SpO2 98%   BMI 34.38 kg/m²   Vitals:    05/10/22 1232 05/10/22 1308 05/10/22 1318   BP: 114/77     Pulse: 98     Resp: 18     Temp: (!) 100.6 °F (38.1 °C) (!) 100.6 °F (38.1 °C) 100.3 °F (37.9 °C)   TempSrc: Oral  Oral   SpO2: 98%     Weight: 96.6 kg (213 lb)     Height: 5' 6" (1.676 m)         Nursing note and vital signs reviewed.  Appearance: No acute distress.  " Well-appearing, nontoxic  Eyes:  No conjunctival injection.  Extraocular muscles are intact.  ENT: Oropharynx clear. No tonsillar exudate or swelling. Uvula midline and normal. No elevation of posterior oropharynx.  Nasal mucosal edema  Lymph:  No cervical lymphadenopathy  Chest/ Respiratory:  Patient well-appearing, in no respiratory distress, breathing is equal and nonlabored, no accessory muscle usage is noted. Patient's speaking in full sentences.  Clear to auscultation bilaterally.  Good air movement.  No wheezes.  No rhonchi. No rales. No accessory muscle use.  Cardiovascular:  Regular rate and rhythm.  No murmurs. No gallops. No rubs.  Musculoskeletal: Neck supple.  No meningismus.  Skin: No rashes seen.  Good turgor.  No abrasions.  No ecchymoses.  Neuro: alert and oriented x3,  no focal neurological deficits.  Psych: Appropriate, conversant    Labs that have been ordered have been independently reviewed and interpreted by myself.  Labs Reviewed   SARS-COV-2 RDRP GENE - Abnormal; Notable for the following components:       Result Value    POC Rapid COVID Positive (*)     All other components within normal limits       No orders to display         Initial Impression/ Differential Dx:  Differential Diagnosis includes, but is not limited to:  meningitis, nasal foreign body, otitis media/external, bacterial sinusitis, allergic rhinitis, influenza, bacterial/viral pharyngitis, bacterial/viral pneumonia, covid-19      MDM:    27 y.o. female with URI symptoms since this morning. Patient was seen for a viral-like illness, therefore due to symptoms it is the most recent recommendations from our hospital administrations/infectious disease at this time, the patient was swabbed for COVID and tested positive. I discussed with the patient the need to self quarantine at home for 5 days from onset of symptoms.  Reinforced this advice and the dangers failing to comply presents to the public.  Symptomatic treatment in the  interim.  Work note for one week was provided. Return precautions including worsening fever that is uncontrollable, shortness of breath or chest pain were discussed.  They ambulated around the emergency department and maintain oxygen saturation of 98% without SOB or ADAMS. Vital signs did not indicate sepsis and patient was welling appear, okay for discharge home for quarantine.  Additional verbal discharge instructions were given and discussed with the patient, return precautions were given and the patient verbalized understanding.            ED Course as of 05/10/22 1838   Tue May 10, 2022   1250 SARS-CoV-2 RNA, Amplification, Qual(!): Positive [AS]      ED Course User Index  [AS] ANIL Matos       Diagnostic Impression:    1. COVID-19         ED Disposition Condition    Discharge Stable          ED Prescriptions     Medication Sig Dispense Start Date End Date Auth. Provider    benzonatate (TESSALON) 100 MG capsule Take 1 capsule (100 mg total) by mouth 3 (three) times daily as needed for Cough. 20 capsule 5/10/2022 5/20/2022 ANIL Matos    albuterol-ipratropium (DUO-NEB) 2.5 mg-0.5 mg/3 mL nebulizer solution  (Status: Discontinued) Take 3 mLs by nebulization every 6 (six) hours as needed for Wheezing. Rescue 75 mL 5/10/2022 5/10/2022 ANIL Matos    albuterol (PROVENTIL/VENTOLIN HFA) 90 mcg/actuation inhaler Inhale 1-2 puffs into the lungs every 6 (six) hours as needed. Rescue 6.7 g 5/10/2022 5/10/2023 ANIL Matos        Follow-up Information     Follow up With Specialties Details Why Contact Info    [facility] Emergency Dept Emergency Medicine Go to  If symptoms worsen [address]  [facility] [id number] [phone]             ANIL Matos  05/10/22 1838

## 2022-05-10 NOTE — Clinical Note
"Angie"Lilia Rodriguez was seen and treated in our emergency department on 5/10/2022.  She may return to work on 05/16/2022.  Pt tested POSITIVE for COVID and to quarantine for the next 4 days until 5/16/2022.     If you have any questions or concerns, please don't hesitate to call.      ANIL Matos"

## 2022-09-19 ENCOUNTER — HOSPITAL ENCOUNTER (EMERGENCY)
Facility: OTHER | Age: 27
Discharge: HOME | End: 2022-09-19
Attending: EMERGENCY MEDICINE
Payer: MEDICAID

## 2022-09-19 VITALS
HEIGHT: 66 IN | OXYGEN SATURATION: 98 % | BODY MASS INDEX: 35.01 KG/M2 | RESPIRATION RATE: 17 BRPM | SYSTOLIC BLOOD PRESSURE: 120 MMHG | DIASTOLIC BLOOD PRESSURE: 68 MMHG | TEMPERATURE: 98 F | HEART RATE: 89 BPM | WEIGHT: 217.81 LBS

## 2022-09-19 DIAGNOSIS — J35.8 TONSILLOLITH: Primary | ICD-10-CM

## 2022-09-19 PROCEDURE — 99282 EMERGENCY DEPT VISIT SF MDM: CPT

## 2022-09-19 NOTE — FIRST PROVIDER EVALUATION
" Emergency Department TeleTriage Encounter Note      CHIEF COMPLAINT    Chief Complaint   Patient presents with    Sore Throat     Pt c/o sore throat, "white patches," and hoarseness        VITAL SIGNS   Initial Vitals [09/19/22 1649]   BP Pulse Resp Temp SpO2   117/70 91 18 98.2 °F (36.8 °C) 98 %      MAP       --            ALLERGIES    Review of patient's allergies indicates:  No Known Allergies    PROVIDER TRIAGE NOTE  This is a teletriage evaluation of a 27 y.o. female presenting to the ED with c/o sore throat and hoarseness for the past 2 days.  Pt coworker has similar symptoms.       PE: VSS.  NAD noted.  Speaking in full sentences.      Plan: labs    Limited physical exam via telehealth: The patient is awake, alert, answering questions appropriately and is not in respiratory distress. All ED beds are full at present; patient notified of this status.  Patient seen and medically screened by Nurse Practitioner via teletriage.      Initial orders will be placed and care will be transferred to an alternate provider when patient is roomed for a full evaluation. Any additional orders and the final disposition will be determined by that provider.         ORDERS  Labs Reviewed   HETEROPHILE AB SCREEN   SARS-COV-2 RDRP GENE       ED Orders (720h ago, onward)      Start Ordered     Status Ordering Provider    09/19/22 1731 09/19/22 1730  POCT COVID-19 Rapid Screening  Once         Ordered MUNA LUND    09/19/22 1731 09/19/22 1730  Heterophile Ab Screen  Once         Ordered MUNA LUND              Virtual Visit Note: The provider triage portion of this emergency department evaluation and documentation was performed via [employer], a HIPAA-compliant telemedicine application, in concert with a tele-presenter in the room. A face to face patient evaluation with one of my colleagues will occur once the patient is placed in an emergency department room.      DISCLAIMER: This note was prepared with M*Modal " voice recognition transcription software. Garbled syntax, mangled pronouns, and other bizarre constructions may be attributed to that software system.

## 2022-09-20 NOTE — DISCHARGE INSTRUCTIONS
Make sure to brush her tongue with your toothbrush when you brush your teeth.    You may use mouthwash in the morning and evening as well.    You are likely to have tonsil stones again.  If you try to remove them yourself, please make sure that you wash her hands thoroughly with soap and water.  Report back to the emergency room if you develop fever, chills, serious sore throat, or other worrisome symptoms.

## 2022-09-20 NOTE — ED PROVIDER NOTES
"Source of History:  Patient    Chief complaint:  Sore Throat (Pt c/o sore throat, "white patches," and hoarseness )      HPI:  Angie Rodriguez is a 27 y.o. female presenting with a scratchy throat.  She states that she looked in the mirror and noticed some white patches the back of her throat.  States she has never seen knees before.  Patient does not currently complain of a sore throat or hoarseness.  Denies fever, chills, nasal congestion, trouble swallowing, shortness of breath, cough, headache.  No known sick contacts.    This is the extent to the patients complaints today here in the emergency department.    ROS: As per HPI and below:  General: No fever.  No chills.  Eyes: No visual changes.  ENT:  + white patches on tonsils  Head: No headache.    Chest: No shortness of breath.  Cardiovascular: No chest pain.  Abdomen: No abdominal pain.  No nausea or vomiting.  Genito-Urinary: No abnormal urination.  Neurologic: No focal weakness.  No numbness.  MSK: no back pain.  Integument: No rashes or lesions.  Immune:  Not immunocompromised    Review of patient's allergies indicates:  No Known Allergies    PMH:  As per HPI and below:  No past medical history on file.  No past surgical history on file.    Social History     Tobacco Use    Smoking status: Former    Smokeless tobacco: Never   Substance Use Topics    Alcohol use: Yes     Comment: occasional     Drug use: No       Physical Exam:    /68   Pulse 89   Temp 98.2 °F (36.8 °C)   Resp 17   Ht 5' 6" (1.676 m)   Wt 217 lb 13 oz (98.8 kg)   SpO2 98%   BMI 35.16 kg/m²   Nursing note and vital signs reviewed.  Appearance: No acute distress.  Nontoxic appearing.  Eyes: No conjunctival injection.  ENT:  3 medium sized tonsilliths on right tonsil.  Chest/ Respiratory: Clear to auscultation bilaterally.  Good air movement.  No wheezes.  No rhonchi. No rales. No accessory muscle use.  Cardiovascular: Regular rate and rhythm.  No murmurs. No gallops. No " rubs.  Musculoskeletal: Good range of motion all joints.  No deformities.  Neck supple.  No meningismus.  Skin: No rashes seen.  Good turgor.  No abrasions.  No ecchymoses.  Neurologic: Motor intact.  Sensation intact.   Mental Status:  Alert and oriented x 3.  Appropriate, conversant    Labs that have been ordered have been independently reviewed and interpreted by myself.      MDM:    Initial impression  27 y.o. female with tonsilliths.  She was concerned because she has never had them before.  Currently without complaints or pain.  She is afebrile and all vital signs are stable.  No further workup necessary.  Plan to remove the tonsil stones with a sterile cotton swab for relief of scratchiness of her throat.    Differential diagnosis includes but is not limited to:  Tonsil stones, tonsillitis, bacterial/viral pharyngitis, viral syndrome, influenza, COVID-19    ED management:  I removed 2 tonsil stones using a sterile swab.  She reported incident alleviation of discomfort.  We discussed oral hygiene and starting to brush her tongue along with the use of mouthwash.  Educated her that these will likely happen again, and that they are not a medical emergency.  Informed her to follow-up with her primary care physician if they become frequent enough to be troublesome as she may need referral to an ear nose and throat doctor.  After taking into careful account the patient's history, physical exam findings, as well as empirical and objective data obtained throughout ED workup, I feel no emergent medical condition has been identified. No further evaluation or admission was felt to be required, and the patient is stable for discharge from the ED. The patient was updated with test results, overall clinical impression, and recommended further plan of care, including discharge instructions as provided and outpatient follow-up for continued evaluation and management as needed. All questions were answered. The patient  expressed understanding and agreed with current plan for discharge and follow-up plan of care. Strict ED return precautions were provided, including return/worsening of current symptoms, new symptoms, or any other concerns.               Diagnostic Impression:    1. Tonsillolith         ED Disposition Condition    Discharge Stable            ED Prescriptions    None       Follow-up Information    None          Robyn Panchal PA-C  09/19/22 5311

## 2023-06-20 ENCOUNTER — PATIENT MESSAGE (OUTPATIENT)
Dept: RESEARCH | Facility: HOSPITAL | Age: 28
End: 2023-06-20
Payer: MEDICAID

## 2023-08-11 ENCOUNTER — PATIENT MESSAGE (OUTPATIENT)
Dept: RESEARCH | Facility: HOSPITAL | Age: 28
End: 2023-08-11
Payer: MEDICAID

## 2023-10-20 ENCOUNTER — HOSPITAL ENCOUNTER (EMERGENCY)
Facility: OTHER | Age: 28
Discharge: HOME | End: 2023-10-21
Attending: EMERGENCY MEDICINE
Payer: MEDICAID

## 2023-10-20 VITALS
HEART RATE: 76 BPM | TEMPERATURE: 98 F | WEIGHT: 215 LBS | SYSTOLIC BLOOD PRESSURE: 142 MMHG | RESPIRATION RATE: 18 BRPM | BODY MASS INDEX: 34.55 KG/M2 | OXYGEN SATURATION: 96 % | DIASTOLIC BLOOD PRESSURE: 89 MMHG | HEIGHT: 66 IN

## 2023-10-20 DIAGNOSIS — H53.8 BLURRY VISION, RIGHT EYE: Primary | ICD-10-CM

## 2023-10-20 PROCEDURE — 99283 EMERGENCY DEPT VISIT LOW MDM: CPT

## 2023-10-20 PROCEDURE — 25000003 PHARM REV CODE 250: Performed by: EMERGENCY MEDICINE

## 2023-10-20 RX ORDER — TETRACAINE HYDROCHLORIDE 5 MG/ML
2 SOLUTION OPHTHALMIC
Status: COMPLETED | OUTPATIENT
Start: 2023-10-20 | End: 2023-10-20

## 2023-10-20 RX ADMIN — FLUORESCEIN SODIUM 1 EACH: 1 STRIP OPHTHALMIC at 11:10

## 2023-10-20 RX ADMIN — TETRACAINE HYDROCHLORIDE 2 DROP: 5 SOLUTION OPHTHALMIC at 11:10

## 2023-10-21 NOTE — ED PROVIDER NOTES
"Encounter Date: 10/20/2023    SCRIBE #1 NOTE: I, Viry Macias, am scribing for, and in the presence of,  Ila Estrada MD.       History     Chief Complaint   Patient presents with    Eye Pain     Right eye X that started hurting this morning, pt states she wears contacts and can not find her contact and her eye is  hurting, pt states her left eye vision is blurry.. pt states without her contact her vision is blurry. Pt states "I think my contact is lost"      Angie Rodriguez is a 28 y.o. female, with no pertinent PMHx, who presents to the ED for evaluation of sensation of foreign body to right eye. PT reports she last put her prescription contacts in this morning and felt like her contact moved in her eye as her right eye vision is blurry. States she feels her contact is stuck in her right eye. Denies trauma to the eye or other inciting factors. No associated eye pain. This is the extent of the patient's complaints at this time.    The history is provided by the patient.     Review of patient's allergies indicates:  No Known Allergies  History reviewed. No pertinent past medical history.  History reviewed. No pertinent surgical history.  Family History   Problem Relation Age of Onset    Breast cancer Neg Hx     Colon cancer Neg Hx     Ovarian cancer Neg Hx      Social History     Tobacco Use    Smoking status: Former    Smokeless tobacco: Never   Substance Use Topics    Alcohol use: Yes     Comment: occasional     Drug use: No     Review of Systems   Constitutional:  Negative for chills and fever.   HENT:  Negative for congestion and sore throat.    Eyes:         +Sensation of foreign body.   Respiratory:  Negative for cough and shortness of breath.    Cardiovascular:  Negative for chest pain and palpitations.   Gastrointestinal:  Negative for abdominal pain, diarrhea and vomiting.   Genitourinary:  Negative for decreased urine volume, dysuria and vaginal discharge.   Musculoskeletal:  Negative for joint swelling, " neck pain and neck stiffness.   Skin:  Negative for rash and wound.   Neurological:  Negative for weakness, numbness and headaches.   Psychiatric/Behavioral:  Negative for confusion.        Physical Exam     Initial Vitals [10/20/23 2253]   BP Pulse Resp Temp SpO2   (!) 142/89 76 18 98.4 °F (36.9 °C) 96 %      MAP       --         Physical Exam    Constitutional: She appears well-developed and well-nourished. She does not have a sickly appearance. No distress.   HENT:   Head: Normocephalic and atraumatic.   Right Ear: External ear normal.   Left Ear: External ear normal.   Eyes: Conjunctivae, EOM and lids are normal. Right eye exhibits no discharge. Left eye exhibits no discharge. Right conjunctiva is not injected. Right conjunctiva has no hemorrhage. Left conjunctiva is not injected. Left conjunctiva has no hemorrhage. No scleral icterus.   No chemosis, hyphema or drainage.  No visible foreign body.  No fluorescein uptake.   Neck: Phonation normal. No stridor present. No tracheal deviation present.   Normal range of motion.  Cardiovascular:  Normal rate, regular rhythm and normal heart sounds.     Exam reveals no friction rub.       No murmur heard.  Pulses:       Radial pulses are 2+ on the right side and 2+ on the left side.        Dorsalis pedis pulses are 2+ on the right side and 2+ on the left side.   Musculoskeletal:      Cervical back: Normal range of motion.     Neurological: She is alert and oriented to person, place, and time. She has normal strength. GCS eye subscore is 4. GCS verbal subscore is 5. GCS motor subscore is 6.   Skin: Skin is warm.   Psychiatric: She has a normal mood and affect. Her speech is normal and behavior is normal. Judgment and thought content normal. Cognition and memory are normal.         ED Course   Procedures  Labs Reviewed   HIV 1 / 2 ANTIBODY   HEPATITIS C ANTIBODY          Imaging Results    None          Medications   fluorescein ophthalmic strip 1 each (1 each Right Eye  Given 10/20/23 2321)   TETRAcaine HCl (PF) 0.5 % Drop 2 drop (2 drops Right Eye Given 10/20/23 2321)     Medical Decision Making  28-year-old female presents with concern for a retained contact lens in the right eye.  Her only symptoms blurry vision which is consistent with her vision when she is not wearing her contact lenses.  Eye exam unremarkable.  There is no evidence of corneal abrasion or ulceration.  There is no evidence of a retained contact lens.  No further care warranted at this time given her exam.  Patient will be discharged at this time stable condition    Amount and/or Complexity of Data Reviewed  External Data Reviewed: notes.  Labs: ordered.    Risk  Prescription drug management.            Scribe Attestation:   Scribe #1: I performed the above scribed service and the documentation accurately describes the services I performed. I attest to the accuracy of the note.      I, Ila Estrada  , personally performed the services described in this documentation. All medical record entries made by the scribe were at my direction and in my presence. I have reviewed the chart and agree that the record reflects my personal performance and is accurate and complete.                        Clinical Impression:   Final diagnoses:  [H53.8] Blurry vision, right eye (Primary)        ED Disposition Condition    Discharge Stable          ED Prescriptions    None       Follow-up Information    None          Ila Estrada MD  10/20/23 6638

## 2023-10-21 NOTE — ED TRIAGE NOTES
"Angie Rodriguez, an 28 y.o. female presents to the ED with eye irritation, she states that she feels like she lost her contact in her R eye somewhere in her R eye. She states she did not take her contacts out today, she can see fine with her L eye, but her vision is blurry in her R eye. Eye does not appear notably irritated at this time. VSS, NAD noted, pt denies any other complaints at this time.      Chief Complaint   Patient presents with    Eye Pain     Right eye X that started hurting this morning, pt states she wears contacts and can not find her contact and her eye is  hurting, pt states her left eye vision is blurry.. pt states without her contact her vision is blurry. Pt states "I think my contact is lost"      Review of patient's allergies indicates:  No Known Allergies  No past medical history on file.      "

## 2024-02-01 ENCOUNTER — HOSPITAL ENCOUNTER (EMERGENCY)
Facility: OTHER | Age: 29
Discharge: HOME | End: 2024-02-01
Attending: EMERGENCY MEDICINE
Payer: MEDICAID

## 2024-02-01 VITALS
TEMPERATURE: 98 F | DIASTOLIC BLOOD PRESSURE: 68 MMHG | HEIGHT: 65 IN | HEART RATE: 72 BPM | SYSTOLIC BLOOD PRESSURE: 128 MMHG | BODY MASS INDEX: 35.82 KG/M2 | WEIGHT: 215 LBS | OXYGEN SATURATION: 99 % | RESPIRATION RATE: 17 BRPM

## 2024-02-01 DIAGNOSIS — S99.929A FOOT INJURY: ICD-10-CM

## 2024-02-01 DIAGNOSIS — S92.354A CLOSED NONDISPLACED FRACTURE OF FIFTH METATARSAL BONE OF RIGHT FOOT, INITIAL ENCOUNTER: Primary | ICD-10-CM

## 2024-02-01 LAB
B-HCG UR QL: NEGATIVE
CTP QC/QA: YES

## 2024-02-01 PROCEDURE — 25000003 PHARM REV CODE 250: Performed by: PHYSICIAN ASSISTANT

## 2024-02-01 PROCEDURE — 81025 URINE PREGNANCY TEST: CPT | Performed by: PHYSICIAN ASSISTANT

## 2024-02-01 PROCEDURE — 99283 EMERGENCY DEPT VISIT LOW MDM: CPT | Mod: 25

## 2024-02-01 RX ORDER — IBUPROFEN 400 MG/1
800 TABLET ORAL
Status: COMPLETED | OUTPATIENT
Start: 2024-02-01 | End: 2024-02-01

## 2024-02-01 RX ORDER — MELOXICAM 15 MG/1
15 TABLET ORAL DAILY
Qty: 30 TABLET | Refills: 0 | Status: SHIPPED | OUTPATIENT
Start: 2024-02-01

## 2024-02-01 RX ADMIN — IBUPROFEN 800 MG: 400 TABLET ORAL at 03:02

## 2024-02-01 NOTE — ED PROVIDER NOTES
"Encounter Date: 2/1/2024       History     Chief Complaint   Patient presents with    Foot Pain     R foot pain after rolling it while walking x 2 hours ago.      This is a 27 y/o female who presents to the ED with right foot pain after missing the step while getting on the bus approx 2 hours prior to arrival. She was initially able to bear weight, but has since become difficult and has been on "tip toes".  Describes aching pain over the side of her foot that does not radiate.  She did not hit her head when she fell.  She is on blood thinners. She has not taken anything for pain prior to arrival. Denies numbness or tingling, shortness of breath, fever.  This is the extent of the patient's complaints at this time    The history is provided by the patient.     Review of patient's allergies indicates:  No Known Allergies  No past medical history on file.  No past surgical history on file.  Family History   Problem Relation Age of Onset    Breast cancer Neg Hx     Colon cancer Neg Hx     Ovarian cancer Neg Hx      Social History     Tobacco Use    Smoking status: Former    Smokeless tobacco: Never   Substance Use Topics    Alcohol use: Yes     Comment: occasional     Drug use: No     Review of Systems   Musculoskeletal:  Positive for arthralgias and myalgias.   Skin:  Negative for color change.   Neurological:  Negative for weakness and numbness.       Physical Exam     Initial Vitals [02/01/24 1233]   BP Pulse Resp Temp SpO2   132/69 69 16 98 °F (36.7 °C) 100 %      MAP       --         Physical Exam    Constitutional: She appears well-developed and well-nourished.  Non-toxic appearance. She does not appear ill. No distress.   HENT:   Head: Normocephalic and atraumatic.   Eyes: Conjunctivae are normal.   Neck: Neck supple.   Cardiovascular:  Normal rate and regular rhythm.           Pulmonary/Chest: Effort normal. No tachypnea. No respiratory distress.   Musculoskeletal:      Cervical back: Neck supple.      Right " foot: Normal range of motion. Swelling (lateral) and tenderness (Over base of 5th metatarsal) present.      Comments: DP/PT pulses intact.  Sensation intact.  Good capillary refill.  Full ROM, pain with plantar flexion     Neurological: She is alert and oriented to person, place, and time. She has normal strength. No sensory deficit.   Skin: Skin is warm, dry and intact.   Psychiatric: She has a normal mood and affect. Her speech is normal and behavior is normal.         ED Course   Procedures  Labs Reviewed   POCT URINE PREGNANCY          Imaging Results               X-Ray Foot Complete Right (Final result)  Result time 02/01/24 14:33:52      Final result by Aftab Anton MD (02/01/24 14:33:52)                   Impression:      Acute, nondisplaced transverse fracture at the base of the 5th metatarsal bone.    This report was flagged in Epic as abnormal.      Electronically signed by: Aftab Anton MD  Date:    02/01/2024  Time:    14:33               Narrative:    EXAMINATION:  XR FOOT COMPLETE 3 VIEW RIGHT    CLINICAL HISTORY:  . Unspecified injury of unspecified foot, initial encounter    TECHNIQUE:  AP, lateral, and oblique views of the right foot were performed.    COMPARISON:  10/03/2018    FINDINGS:  Bones are well mineralized.  Mild hallux valgus.  Elsewhere, alignment is satisfactory and joint spaces appear adequately maintained.  As best seen on the frontal and oblique views, there is a transversely oriented linear lucency at the base of the 5th metatarsal bone compatible with an acute, essentially nondisplaced fracture.  No other evidence of fracture or dislocation.  No soft tissue abnormality appreciated.                                       Medications   ibuprofen tablet 800 mg (800 mg Oral Given 2/1/24 1092)     Medical Decision Making  Urgent evaluation of an afebrile 28-year-old female with right foot pain after mechanical fall that happened prior to arrival.  Physical exam reveals point tenderness  over the base of the 5th metatarsal with mild edema.  No obvious deformity, overlying ecchymosis or erythema.  She is distally neurovascularly intact.  Ice applied and anti-inflammatories given.  Will obtain imaging, treat, and reassess.    Differential diagnosis includes but is not limited to:  Muscle strain, ligament tear/sprain, soft tissue contusion, osteoarthritis, fracture, nerve injury, bursitis    Patient remains afebrile nontoxic-appearing.  Reports some improvement in symptoms with ice and ibuprofen.  X-ray shows avulsion fracture at base of right 5th metatarsal.  Patient placed in short pneumatic walking boot and provided with crutches.  She was encouraged to bear weight as able to tolerate.  She will follow-up with podiatry outpatient, referral placed.  Patient instructed on RICE protocol and prescription for anti-inflammatories provided.  Patient educated on signs and symptoms to monitor for and when to return to ED. Patient verbalized understanding agrees with treatment plan. All questions and concerns addressed.       Amount and/or Complexity of Data Reviewed  Radiology: ordered. Decision-making details documented in ED Course.    Risk  Prescription drug management.                                Clinical Impression:  Final diagnoses:  [S99.929A] Foot injury  [S92.354A] Closed nondisplaced fracture of fifth metatarsal bone of right foot, initial encounter (Primary)          ED Disposition Condition    Discharge Stable          ED Prescriptions       Medication Sig Dispense Start Date End Date Auth. Provider    meloxicam (MOBIC) 15 MG tablet Take 1 tablet (15 mg total) by mouth once daily. 30 tablet 2/1/2024 -- Robyn Panchal PA-C          Follow-up Information       Follow up With Specialties Details Why Contact Info    [facility] Schedule an appointment as soon as possible for a visit   [address] [place] [address] [place]. [address] [place] [id number] [phone]    [facility] Dept Emergency Medicine Go to  If symptoms worsen [address]  [facility] [id number] [phone]             Robyn Panchal PAKERRIE  02/01/24 1536

## 2024-02-01 NOTE — ED TRIAGE NOTES
States she rolled right foot as she was stepping onto bus about 2 hrs ago. Reports painful weight bearing. Presents in no distress.

## 2024-02-01 NOTE — Clinical Note
"Angie Lynryan Rodriguez was seen and treated in our emergency department on 2/1/2024.  She may return to work on 02/07/2024.       If you have any questions or concerns, please don't hesitate to call.      Ria QUINTERO RN    "

## 2024-02-01 NOTE — FIRST PROVIDER EVALUATION
" Emergency Department TeleTriage Encounter Note      CHIEF COMPLAINT    Chief Complaint   Patient presents with    Foot Pain     R foot pain after rolling it while walking x 2 hours ago.        VITAL SIGNS   Initial Vitals [02/01/24 1233]   BP Pulse Resp Temp SpO2   132/69 69 16 98 °F (36.7 °C) 100 %      MAP       --            ALLERGIES    Review of patient's allergies indicates:  No Known Allergies    PROVIDER TRIAGE NOTE  This is a teletriage evaluation of a 28 y.o. female presenting to the ED complaining of foot pain. Patient reports right foot pain and swelling after rolling it while walking in platform converse tennis shoes. She denies ankle pain. She denies taking any medications PTA. She states there is "something sticking out."    Patient is alert and oriented. She speaks in complete sentences. She is sitting upright in the chair in no distress. Foot not visualized.    Initial orders will be placed and care will be transferred to an alternate provider when patient is roomed for a full evaluation. Any additional orders and the final disposition will be determined by that provider.         ORDERS  Labs Reviewed   POCT URINE PREGNANCY       ED Orders (720h ago, onward)      Start Ordered     Status Ordering Provider    02/01/24 1300 02/01/24 1249  ibuprofen tablet 800 mg  ED 1 Time         Ordered JAMIL MCLEAN.    02/01/24 1250 02/01/24 1249  Apply ice to affected area  Once         Ordered JAMIL MCLEAN.    02/01/24 1250 02/01/24 1249  POCT urine pregnancy  Once         Ordered JAMIL MCLEAN.    02/01/24 1249 02/01/24 1249  X-Ray Foot Complete Right  1 time imaging         Ordered JAMIL MCLEAN              Virtual Visit Note: The provider triage portion of this emergency department evaluation and documentation was performed via [employer], a HIPAA-compliant telemedicine application, in concert with a tele-presenter in the room. A face to face patient evaluation with one of my colleagues will occur " once the patient is placed in an emergency department room.      DISCLAIMER: This note was prepared with [email or web address] voice recognition transcription software. Garbled syntax, mangled pronouns, and other bizarre constructions may be attributed to that software system.

## 2024-02-06 ENCOUNTER — PATIENT OUTREACH (OUTPATIENT)
Dept: EMERGENCY MEDICINE | Facility: OTHER | Age: 29
End: 2024-02-06
Payer: MEDICAID

## 2024-02-06 NOTE — PROGRESS NOTES
Lexi Villatoro LPN  ED Navigator  Emergency Department    Project: [facility] [place] Navigator  Role: Community Health Worker    Date: 02/06/2024  Patient Name: Angie Rodriguez  MRN: [id number]  PCP: No, Primary Doctor    Assessment:     Angie Rodriguez is a 28 y.o. female who has presented to ED for Foot Pain. Patient has visited the ED 1 times in the past 3 months. Patient did not contact PCP.     ED Navigator Initial Assessment    ED Navigator Enrollment Documentation  Consent to Services  Does patient consent to completing the assessment?: Yes  Contact  Method of Initial Contact: Phone  Transportation  Does the patient have issues with Transportation?: No  Does the patient have transportation to and from healthcare appointments?: Yes  Insurance Coverage  Do you have coverage/adequate coverage?: Yes  Type/kind of coverage: [facility] Medicaid  Is patient able to afford co-pays/deductibles?: Yes  Is patient able to afford HME or supplies?: Yes  Does patient have an established [facility] PCP?: No  Does patient need assistance finding a PCP?: Yes  Does the patient have a lack of adequate coverage?: No  Specialist Appointment  Did the patient come to the ED to see a specialist?: No (Comment: Unknown)  Does the patient have a pending specialist referral?: Yes  Does the patient have a specialist appointment made?: No  PCP Follow Up Appointment  Has the patient had an appointment with a primary care provider in the past year?: No  Does the patient have a follow up appontment with a PCP?: No  When was the last time you saw your PCP?: 2/6/23  Why does the patient not have a follow up scheduled?: No established [facility]/outside PCP  Would you like an [facility] PCP?: Yes  Medications  Is patient able to afford medication?: Yes  Is patient unable to get medication due to lack of transportation?: No  Psychological  Does the patient have psycho-social concerns?: No  Food  Does the patient have concerns about food?: No  Communication/Education  Does the  patient have limited English proficiency/English not primary language?: No  Does patient have low literacy and/or low health literacy?: Yes  Does patient have concerns with care?: No  Does patient have dissatisfaction with care?: No  Other Financial Concerns  Does the patient have immediate financial distress?: No  Does the patient have general financial concerns?: No  Other Social Barriers/Concerns  Does the patient have any additional barriers or concerns?: None  Primary Barrier  Barriers identified: Cognitive barrier (health literacy, language and communication, etc.)  Root Cause of ED Utilization: Lack of Access to Primary Care  Plan to address Lack of Access to Primary Care: Provided patient with information about the [facility] in their area, Provided [facility] PCP assistance line [phone], Provided information for [facility] ([facility], [employer], etc.), Provided information for [facility] On [facility] 24/7 Nurse Triage line [phone] or 1-866-[facility] ([phone])  Next steps: Provided Education  Was education/educational materials provided surrounding PCP services/creating a medical home?: Yes Was education verbal or written?: Verbal, Written     Was education/educational materials provided surrounding low cost, healthy foods?: Yes Was education verbal or written?: Written     Was education/educational materials provided surrounding other items? If so, use comment to explain.: Yes (Comment: [place] virtual visit flyer, eating healthy plate, [facility], [facility] on call RN#, [facility] P[facility] scheduling assistance) Was education verbal or written?: Written   Plan: Provided information for [facility] ([facility], [facility] [place]Etc..)  Expected Date of Follow Up 1: 5/29/24  Additional Documentation: Spoke with patient today and she was agreeable to  enrollment and subsequent F/U calls. Pt. Denies any psychosocial needs at this time. Pt. Is not established with primary care and was agreeable to receiving assistance with getting scheduled at the [facility]. Appointment request sent to CAROLINA Cárdenas at the clinic. Was unable to schedule with [facility] as the phone line was no longer available for [facility]. I have sent other options within network of patient's insurance via e-mail. Pt. Denies smoking. Pt. Drinks only on occasion socially. Pt. Denied the need for any outside community resources at this time. [facility] form, [place] Virtual Visit Flyer, [facility] PCP scheduling assistance, [facility] on call RN#, and Heart Healthy Diet education all sent to email.         Social History     Socioeconomic History    Marital status: Single   Tobacco Use    Smoking status: Former    Smokeless tobacco: Never   Substance and Sexual Activity    Alcohol use: Yes     Comment: occasional     Drug use: No    Sexual activity: Not Currently     Social Determinants of Health     Financial Resource Strain: Low Risk  (2/6/2024)    Overall Financial Resource Strain (CARDIA)     Difficulty of Paying Living Expenses: Not hard at all   Food Insecurity: No Food Insecurity (2/6/2024)    Hunger Vital Sign     Worried About Running Out of Food in the Last Year: Never true     Ran Out of Food in the Last Year: Never true   Transportation Needs: No Transportation Needs (2/6/2024)    PRAPARE - Transportation     Lack of Transportation (Medical): No     Lack of Transportation (Non-Medical): No   Stress: No Stress Concern Present (2/6/2024)    [employer] - Occupational Stress Questionnaire     Feeling of Stress : Not at all   Social Connections: Unknown (2/6/2024)    Social Connection and Isolation Panel [NHANES]     Marital Status: Never    Housing Stability: Unknown (2/6/2024)    Housing Stability Vital Sign     Unable to Pay for Housing in  the Last Year: No     Unstable Housing in the Last Year: No       Plan:   Spoke with patient today and she was agreeable to enrollment and subsequent F/U calls. Pt. Denies any psychosocial needs at this time. Pt. Is not established with primary care and was agreeable to receiving assistance with getting scheduled at the [facility]. Appointment request sent to CAROLINA Cárdenas at the clinic. Was unable to schedule with [facility] as the phone line was no longer available for [facility]. I have sent other options within network of patient's insurance via e-mail. Pt. Denies smoking. Pt. Drinks only on occasion socially. Pt. Denied the need for any outside community resources at this time. [facility] form, [place] Virtual Visit Flyer, [facility] P[facility] scheduling assistance, [facility] on call RN#, and Heart Healthy Diet education all sent to email.